# Patient Record
Sex: FEMALE | Race: WHITE | NOT HISPANIC OR LATINO | URBAN - METROPOLITAN AREA
[De-identification: names, ages, dates, MRNs, and addresses within clinical notes are randomized per-mention and may not be internally consistent; named-entity substitution may affect disease eponyms.]

---

## 2024-03-25 ENCOUNTER — EMERGENCY (EMERGENCY)
Facility: HOSPITAL | Age: 29
LOS: 1 days | Discharge: HOME | End: 2024-03-25
Admitting: EMERGENCY MEDICINE
Payer: OTHER MISCELLANEOUS

## 2024-03-25 VITALS
SYSTOLIC BLOOD PRESSURE: 127 MMHG | OXYGEN SATURATION: 100 % | DIASTOLIC BLOOD PRESSURE: 73 MMHG | TEMPERATURE: 98 F | HEART RATE: 92 BPM | RESPIRATION RATE: 17 BRPM

## 2024-03-25 PROCEDURE — 99284 EMERGENCY DEPT VISIT MOD MDM: CPT

## 2024-03-25 RX ORDER — RALTEGRAVIR 400 MG/1
1 TABLET, FILM COATED ORAL
Qty: 42 | Refills: 0
Start: 2024-03-25 | End: 2024-04-14

## 2024-03-25 RX ORDER — EMTRICITABINE AND TENOFOVIR DISOPROXIL FUMARATE 200; 300 MG/1; MG/1
1 TABLET, FILM COATED ORAL
Qty: 21 | Refills: 0
Start: 2024-03-25 | End: 2024-04-14

## 2024-03-25 NOTE — ED PROVIDER NOTE - PATIENT PORTAL LINK FT
You can access the FollowMyHealth Patient Portal offered by [facility] by registering at the following website: [email or web address]. By joining [employer] FollowMyHealth portal, you will also be able to view your health information using other applications (apps) compatible with our system.

## 2024-03-25 NOTE — ED PROVIDER NOTE - PHYSICAL EXAMINATION
Skin: left thumb with area where the needle was entered. no active bleeding. FROm of thumb. no weakness. sensation intact.

## 2024-03-25 NOTE — ED PROVIDER NOTE - CLINICAL SUMMARY MEDICAL DECISION MAKING FREE TEXT BOX
28 yo female with needlestick injury.   Vaccinations are up to date  Pt wishes to get Pep  will draw the labs, Pep, [facility] follow up

## 2024-03-25 NOTE — ED PROVIDER NOTE - PROGRESS NOTE DETAILS
Pt given the Pep medication supply for 7 days and rx for 21 days sent to pharmacy.   pt instructed to follow up with Employee health in 1-2 days  Pt agreeable to follow up the results.

## 2024-03-25 NOTE — ED PROVIDER NOTE - OBJECTIVE STATEMENT
28 yo female with no pmhx presents to the ED s/p needlestick injury. pt states while in the ER she accidently pocked herself in the left thumb while injecting lidocaine. pt states there was minimal bleeding which stopped. she states she does not know the status of the pt, however her HIV is pending.  pt states she wishes to get Pep medication. pt states her Hep B and Tdap vaccinations are up to date.

## 2024-03-25 NOTE — ED PROVIDER NOTE - NSFOLLOWUPINSTRUCTIONS_ED_ALL_ED_FT
Follow up with Employee health in 1-2 days by making an appointment  Follow up the results.   Return to Emergency room with any worsening symptoms or no improvements